# Patient Record
Sex: MALE | Race: WHITE | NOT HISPANIC OR LATINO | URBAN - METROPOLITAN AREA
[De-identification: names, ages, dates, MRNs, and addresses within clinical notes are randomized per-mention and may not be internally consistent; named-entity substitution may affect disease eponyms.]

---
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
## 2025-04-01 NOTE — PROCEDURE: HIGH RISK MEDICATION MONITORING
Will start lisinopril lisinopril 5 mg daily  We will recheck blood pressure in 2 weeks (please call patient and confirm appointment)  BMP in 2 weeks to check for creatinine  Will consider increasing lisinopril adding hydrochlorothiazide if blood pressure continues high.    Plan discussed with patient over the phone.  ------------------------------  Quirino Christy DO,  Internal Medicine     Enbrel Counseling:  I discussed with the patient the risks of etanercept including but not limited to myelosuppression, immunosuppression, autoimmune hepatitis, demyelinating diseases, lymphoma, and infections.  The patient understands that monitoring is required including a PPD at baseline and must alert us or the primary physician if symptoms of infection or other concerning signs are noted.
